# Patient Record
Sex: FEMALE | Race: WHITE
[De-identification: names, ages, dates, MRNs, and addresses within clinical notes are randomized per-mention and may not be internally consistent; named-entity substitution may affect disease eponyms.]

---

## 2018-12-18 PROBLEM — Z00.00 ENCOUNTER FOR PREVENTIVE HEALTH EXAMINATION: Status: ACTIVE | Noted: 2018-12-18

## 2018-12-19 ENCOUNTER — APPOINTMENT (OUTPATIENT)
Dept: FAMILY MEDICINE | Facility: CLINIC | Age: 53
End: 2018-12-19
Payer: COMMERCIAL

## 2018-12-19 VITALS
HEIGHT: 67 IN | BODY MASS INDEX: 33.12 KG/M2 | HEART RATE: 71 BPM | OXYGEN SATURATION: 97 % | WEIGHT: 211 LBS | SYSTOLIC BLOOD PRESSURE: 134 MMHG | DIASTOLIC BLOOD PRESSURE: 76 MMHG | TEMPERATURE: 99 F

## 2018-12-19 DIAGNOSIS — Z80.1 FAMILY HISTORY OF MALIGNANT NEOPLASM OF TRACHEA, BRONCHUS AND LUNG: ICD-10-CM

## 2018-12-19 DIAGNOSIS — N63.0 UNSPECIFIED LUMP IN UNSPECIFIED BREAST: ICD-10-CM

## 2018-12-19 DIAGNOSIS — Z78.9 OTHER SPECIFIED HEALTH STATUS: ICD-10-CM

## 2018-12-19 DIAGNOSIS — J18.1 LOBAR PNEUMONIA, UNSPECIFIED ORGANISM: ICD-10-CM

## 2018-12-19 DIAGNOSIS — Z12.11 ENCOUNTER FOR SCREENING FOR MALIGNANT NEOPLASM OF COLON: ICD-10-CM

## 2018-12-19 DIAGNOSIS — Z83.49 FAMILY HISTORY OF OTHER ENDOCRINE, NUTRITIONAL AND METABOLIC DISEASES: ICD-10-CM

## 2018-12-19 DIAGNOSIS — Z86.79 PERSONAL HISTORY OF OTHER DISEASES OF THE CIRCULATORY SYSTEM: ICD-10-CM

## 2018-12-19 PROCEDURE — 99203 OFFICE O/P NEW LOW 30 MIN: CPT

## 2018-12-19 RX ORDER — HYDROCHLOROTHIAZIDE 25 MG/1
25 TABLET ORAL
Refills: 0 | Status: ACTIVE | COMMUNITY

## 2018-12-19 RX ORDER — AZITHROMYCIN 250 MG/1
250 TABLET, FILM COATED ORAL
Qty: 1 | Refills: 0 | Status: ACTIVE | COMMUNITY
Start: 2018-12-19 | End: 1900-01-01

## 2018-12-19 NOTE — HISTORY OF PRESENT ILLNESS
[Cough] : cough [Sore Throat] : sore throat [___ Days ago] : [unfilled] days ago [Chills] : chills [Anorexia] : anorexia [Fatigue] : fatigue [Stable] : stable [Moderate] : moderate [Constant] : constant [Congestion] : no congestion [Shortness Of Breath] : no shortness of breath [Earache] : earache [Headache] : no headache [Fever] : no fever [OTC Remedies] : OTC remedies [FreeTextEntry4] : afternoon

## 2018-12-19 NOTE — PHYSICAL EXAM
[No Acute Distress] : no acute distress [Well Developed] : well developed [No Respiratory Distress] : no respiratory distress  [No Accessory Muscle Use] : no accessory muscle use [Normal Rate] : normal rate  [Regular Rhythm] : with a regular rhythm [Normal S1, S2] : normal S1 and S2 [No Extremity Clubbing/Cyanosis] : no extremity clubbing/cyanosis [Non-distended] : non-distended [Normal Posterior Cervical Nodes] : no posterior cervical lymphadenopathy [Normal Anterior Cervical Nodes] : no anterior cervical lymphadenopathy [Normal Affect] : the affect was normal [Normal Insight/Judgement] : insight and judgment were intact [de-identified] : Increased coarse breath sounds at the right base

## 2020-09-10 ENCOUNTER — APPOINTMENT (OUTPATIENT)
Dept: SURGERY | Facility: CLINIC | Age: 55
End: 2020-09-10

## 2020-11-19 ENCOUNTER — APPOINTMENT (OUTPATIENT)
Dept: SURGERY | Facility: CLINIC | Age: 55
End: 2020-11-19
Payer: COMMERCIAL

## 2020-11-19 PROCEDURE — 99072 ADDL SUPL MATRL&STAF TM PHE: CPT

## 2020-11-19 PROCEDURE — 99213 OFFICE O/P EST LOW 20 MIN: CPT

## 2021-06-24 ENCOUNTER — APPOINTMENT (OUTPATIENT)
Dept: SURGERY | Facility: CLINIC | Age: 56
End: 2021-06-24

## 2021-12-02 ENCOUNTER — APPOINTMENT (OUTPATIENT)
Dept: SURGERY | Facility: CLINIC | Age: 56
End: 2021-12-02
Payer: COMMERCIAL

## 2021-12-02 VITALS
SYSTOLIC BLOOD PRESSURE: 130 MMHG | DIASTOLIC BLOOD PRESSURE: 81 MMHG | HEIGHT: 67 IN | OXYGEN SATURATION: 96 % | BODY MASS INDEX: 32.18 KG/M2 | TEMPERATURE: 97.6 F | HEART RATE: 67 BPM | WEIGHT: 205 LBS

## 2021-12-02 PROCEDURE — 99213 OFFICE O/P EST LOW 20 MIN: CPT

## 2022-06-02 ENCOUNTER — APPOINTMENT (OUTPATIENT)
Dept: SURGERY | Facility: CLINIC | Age: 57
End: 2022-06-02

## 2023-02-23 ENCOUNTER — APPOINTMENT (OUTPATIENT)
Dept: SURGERY | Facility: CLINIC | Age: 58
End: 2023-02-23
Payer: COMMERCIAL

## 2023-02-23 VITALS
SYSTOLIC BLOOD PRESSURE: 150 MMHG | DIASTOLIC BLOOD PRESSURE: 77 MMHG | WEIGHT: 162 LBS | OXYGEN SATURATION: 98 % | HEIGHT: 67 IN | BODY MASS INDEX: 25.43 KG/M2 | TEMPERATURE: 98 F | HEART RATE: 89 BPM

## 2023-02-23 DIAGNOSIS — N60.12 DIFFUSE CYSTIC MASTOPATHY OF LEFT BREAST: ICD-10-CM

## 2023-02-23 DIAGNOSIS — N60.11 DIFFUSE CYSTIC MASTOPATHY OF LEFT BREAST: ICD-10-CM

## 2023-02-23 DIAGNOSIS — Z12.39 ENCOUNTER FOR OTHER SCREENING FOR MALIGNANT NEOPLASM OF BREAST: ICD-10-CM

## 2023-02-23 PROCEDURE — 99213 OFFICE O/P EST LOW 20 MIN: CPT

## 2024-10-02 ENCOUNTER — APPOINTMENT (OUTPATIENT)
Dept: ORTHOPEDIC SURGERY | Facility: CLINIC | Age: 59
End: 2024-10-02
Payer: COMMERCIAL

## 2024-10-02 VITALS — WEIGHT: 162 LBS | BODY MASS INDEX: 25.43 KG/M2 | HEIGHT: 67 IN

## 2024-10-02 VITALS — HEIGHT: 67 IN | WEIGHT: 177 LBS | BODY MASS INDEX: 27.78 KG/M2

## 2024-10-02 DIAGNOSIS — Z87.81 OTHER SPECIFIED POSTPROCEDURAL STATES: ICD-10-CM

## 2024-10-02 DIAGNOSIS — Z86.19 PERSONAL HISTORY OF OTHER INFECTIOUS AND PARASITIC DISEASES: ICD-10-CM

## 2024-10-02 DIAGNOSIS — Z82.49 FAMILY HISTORY OF ISCHEMIC HEART DISEASE AND OTHER DISEASES OF THE CIRCULATORY SYSTEM: ICD-10-CM

## 2024-10-02 DIAGNOSIS — Z98.890 OTHER SPECIFIED POSTPROCEDURAL STATES: ICD-10-CM

## 2024-10-02 DIAGNOSIS — M67.979 UNSPECIFIED DISORDER OF SYNOVIUM AND TENDON, UNSPECIFIED ANKLE AND FOOT: ICD-10-CM

## 2024-10-02 PROCEDURE — 73630 X-RAY EXAM OF FOOT: CPT | Mod: RT

## 2024-10-02 PROCEDURE — 73600 X-RAY EXAM OF ANKLE: CPT | Mod: RT

## 2024-10-02 PROCEDURE — 99204 OFFICE O/P NEW MOD 45 MIN: CPT

## 2024-10-02 RX ORDER — ZINC SULFATE 50(220)MG
CAPSULE ORAL
Refills: 0 | Status: ACTIVE | COMMUNITY

## 2024-10-02 NOTE — HISTORY OF PRESENT ILLNESS
[Gradual] : gradual [7] : 7 [Burning] : burning [Localized] : localized [Sharp] : sharp [Shooting] : shooting [Tingling] : tingling [Work] : work [Physical therapy] : physical therapy [Full time] : Work status: full time [de-identified] : 10/2/24: patient here for further evaluation of her left foot/ankle. She slipped on ice 2 years ago and broke ankle and had sx. then a year later had a reaction went back in and had plate removed . then 2-3 weeks ago saw surgeon for a routine follow up. one week ago had a PRP with the surgeon . here for another opinion. Still having pain.  [] : no [FreeTextEntry1] : rt ankle [FreeTextEntry5] : slipped on ice 2 years ago and broke ankle and had sx. then a year later had a reaction went back in and had plate removed . then 2-3 weeks ago saw surgeon for a routine follow up. one week ago had a PRP with the surgeon . here for another opinion  [FreeTextEntry9] : orthotics for flat feet, brace for work  [de-identified] : worse at night  [de-identified] : Dr. 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

## 2024-10-02 NOTE — DISCUSSION/SUMMARY
[de-identified] : Bill brace HEP/PT  The patient requires an AFO for stabilization at the ankle and has the potential to benefit functionally and to return to ADL's safely.  The patient cannot be fitted with a prefabricated brace due to the ankle deformities and the shape of the current limb.  The patient requires stabilization in multiple planes due to the deformities present.  The condition necessitating the orthosis is expected to be of longstanding duration. Lining indicated due to at-risk fragile skin.  This custom molded AFO will allow the patient to return to the level of independence they are accustomed to and be custom fitted for total contact.

## 2024-10-02 NOTE — PHYSICAL EXAM
[Right] : right foot and ankle [NL (20)] : eversion 20 degrees [2+] : dorsalis pedis pulse: 2+ [] : non-antalgic [TWNoteComboBox7] : dorsiflexion 10 degrees [de-identified] : plantar flexion 30 degrees [de-identified] : inversion 0 degrees

## 2024-10-02 NOTE — DATA REVIEWED
[MRI] : MRI [Right] : of the right [Ankle] : ankle [Report was reviewed and noted in the chart] : The report was reviewed and noted in the chart [FreeTextEntry1] : Healed Spangler B fracture of the distal right fibula, with partial removal of internal fixation hardware. There is a small posttraumatic spur at the anterior thoracic of the healed lateral malleolus.  Moderate subtalar osteoarthritis as detailed above. Additional osteoarthritis in the midfoot as per above.  Severe distal tibial posterior tendinosis, tenosynovitis and partial tendon tearing as noted.  Achilles and plantar fascia calcaneal enthesophytes.  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

## 2024-10-02 NOTE — IMAGING
[Right] : right foot [Weight -] : weightbearing [FreeTextEntry9] : med mall spur, reatained screw x 2 fibula [de-identified] : midfoot jose luis

## 2024-11-10 ENCOUNTER — OFFICE (OUTPATIENT)
Dept: URBAN - METROPOLITAN AREA CLINIC 12 | Facility: CLINIC | Age: 59
Setting detail: OPHTHALMOLOGY
End: 2024-11-10
Payer: COMMERCIAL

## 2024-11-10 DIAGNOSIS — H43.811: ICD-10-CM

## 2024-11-10 DIAGNOSIS — H35.373: ICD-10-CM

## 2024-11-10 DIAGNOSIS — H26.493: ICD-10-CM

## 2024-11-10 DIAGNOSIS — Z96.1: ICD-10-CM

## 2024-11-10 DIAGNOSIS — H35.412: ICD-10-CM

## 2024-11-10 DIAGNOSIS — D31.32: ICD-10-CM

## 2024-11-10 PROBLEM — H16.223 DRY EYE SYNDROME K SICCA; BOTH EYES: Status: ACTIVE | Noted: 2024-11-10

## 2024-11-10 PROCEDURE — 92014 COMPRE OPH EXAM EST PT 1/>: CPT | Performed by: OPHTHALMOLOGY

## 2024-11-10 PROCEDURE — 92250 FUNDUS PHOTOGRAPHY W/I&R: CPT | Performed by: OPHTHALMOLOGY

## 2024-11-10 ASSESSMENT — KERATOMETRY
OS_AXISANGLE_DEGREES: 115
OD_AXISANGLE_DEGREES: 155
OS_K1POWER_DIOPTERS: 46.25
OS_K2POWER_DIOPTERS: 46.50
OD_K2POWER_DIOPTERS: 46.75
OD_K1POWER_DIOPTERS: 46.25

## 2024-11-10 ASSESSMENT — REFRACTION_CURRENTRX
OS_AXIS: 180
OS_CYLINDER: 0.00
OS_VPRISM_DIRECTION: SV
OS_OVR_VA: 20/
OD_CYLINDER: -0.75
OS_VPRISM_DIRECTION: SV
OD_ADD: +1.50
OS_ADD: +1.50
OD_OVR_VA: 20/
OS_SPHERE: -0.75
OD_AXIS: 094
OD_VPRISM_DIRECTION: SV
OS_OVR_VA: 20/
OD_OVR_VA: 20/
OD_VPRISM_DIRECTION: SV
OD_SPHERE: -1.25

## 2024-11-10 ASSESSMENT — REFRACTION_MANIFEST
OS_SPHERE: +0.50
OD_AXIS: 080
OD_VA1: 20/20
OS_VA1: 20/20
OD_CYLINDER: -0.75
OS_CYLINDER: -0.50
OS_AXIS: 070
OD_SPHERE: +0.50

## 2024-11-10 ASSESSMENT — VISUAL ACUITY
OS_BCVA: 20/20
OD_BCVA: 20/20

## 2024-11-10 ASSESSMENT — CONFRONTATIONAL VISUAL FIELD TEST (CVF)
OS_FINDINGS: FULL
OD_FINDINGS: FULL

## 2024-11-10 ASSESSMENT — TONOMETRY
OS_IOP_MMHG: 17
OD_IOP_MMHG: 14

## 2024-11-10 ASSESSMENT — SUPERFICIAL PUNCTATE KERATITIS (SPK)
OS_SPK: T
OD_SPK: T

## 2024-11-10 ASSESSMENT — REFRACTION_AUTOREFRACTION
OD_SPHERE: +0.50
OD_CYLINDER: -0.75
OS_SPHERE: +0.50
OS_CYLINDER: -0.50
OS_AXIS: 069
OD_AXIS: 081

## 2024-11-13 ENCOUNTER — APPOINTMENT (OUTPATIENT)
Dept: ORTHOPEDIC SURGERY | Facility: CLINIC | Age: 59
End: 2024-11-13

## 2024-12-19 ENCOUNTER — APPOINTMENT (OUTPATIENT)
Dept: SURGERY | Facility: CLINIC | Age: 59
End: 2024-12-19

## 2025-02-13 ENCOUNTER — APPOINTMENT (OUTPATIENT)
Dept: SURGERY | Facility: CLINIC | Age: 60
End: 2025-02-13
Payer: COMMERCIAL

## 2025-02-13 VITALS
HEIGHT: 67 IN | BODY MASS INDEX: 28.56 KG/M2 | RESPIRATION RATE: 16 BRPM | WEIGHT: 182 LBS | DIASTOLIC BLOOD PRESSURE: 72 MMHG | SYSTOLIC BLOOD PRESSURE: 130 MMHG | HEART RATE: 65 BPM | OXYGEN SATURATION: 98 % | TEMPERATURE: 98.4 F

## 2025-02-13 DIAGNOSIS — N63.0 UNSPECIFIED LUMP IN UNSPECIFIED BREAST: ICD-10-CM

## 2025-02-13 PROCEDURE — 99214 OFFICE O/P EST MOD 30 MIN: CPT
